# Patient Record
Sex: MALE | Race: BLACK OR AFRICAN AMERICAN | NOT HISPANIC OR LATINO | Employment: FULL TIME | URBAN - METROPOLITAN AREA
[De-identification: names, ages, dates, MRNs, and addresses within clinical notes are randomized per-mention and may not be internally consistent; named-entity substitution may affect disease eponyms.]

---

## 2020-03-11 ENCOUNTER — HOSPITAL ENCOUNTER (EMERGENCY)
Facility: OTHER | Age: 40
Discharge: HOME | End: 2020-03-11
Attending: EMERGENCY MEDICINE

## 2020-03-11 VITALS
RESPIRATION RATE: 18 BRPM | HEIGHT: 67 IN | TEMPERATURE: 98 F | HEART RATE: 68 BPM | SYSTOLIC BLOOD PRESSURE: 137 MMHG | WEIGHT: 165 LBS | BODY MASS INDEX: 25.9 KG/M2 | OXYGEN SATURATION: 97 % | DIASTOLIC BLOOD PRESSURE: 97 MMHG

## 2020-03-11 DIAGNOSIS — H10.32 ACUTE CONJUNCTIVITIS OF LEFT EYE, UNSPECIFIED ACUTE CONJUNCTIVITIS TYPE: Primary | ICD-10-CM

## 2020-03-11 PROCEDURE — 25000003 PHARM REV CODE 250: Performed by: PHYSICIAN ASSISTANT

## 2020-03-11 PROCEDURE — 99283 EMERGENCY DEPT VISIT LOW MDM: CPT

## 2020-03-11 RX ORDER — POLYMYXIN B SULFATE AND TRIMETHOPRIM 1; 10000 MG/ML; [USP'U]/ML
1 SOLUTION OPHTHALMIC EVERY 6 HOURS
Qty: 1 BOTTLE | Refills: 1 | Status: SHIPPED | OUTPATIENT
Start: 2020-03-11

## 2020-03-11 RX ORDER — TETRACAINE HYDROCHLORIDE 5 MG/ML
2 SOLUTION OPHTHALMIC
Status: COMPLETED | OUTPATIENT
Start: 2020-03-11 | End: 2020-03-11

## 2020-03-11 RX ADMIN — TETRACAINE HYDROCHLORIDE 2 DROP: 5 SOLUTION OPHTHALMIC at 02:03

## 2020-03-11 RX ADMIN — FLUORESCEIN SODIUM 1 EACH: 1 STRIP OPHTHALMIC at 02:03

## 2020-03-11 NOTE — ED PROVIDER NOTES
"Encounter Date: 3/11/2020    SCRIBE #1 NOTE: I, Norma Giordanoaver, am scribing for, and in the presence of, Dr. Lei .       History     Chief Complaint   Patient presents with    Eye Pain     +Pt reporting L eye pain and redness with white drainage x2 days. Reports blurred vision to L eye. Denies trauma or injury. Eye movements symmetrical.      Time seen by provider: 2:55 PM    This is a 39 y.o. male who presents with complaint of left eye redness that began two days ago. Patient reports onset of left eye redness, itchiness, mild pain, and clear discharge two mornings ago. He reports "vision fogginess" while driving earlier that has since resolved. He reports using OTC eye drops with no relief. He denies any trauma or injury to the eye. He denies wearing glasses or contacts. He denies cough, rhinorrhea, sore throat, fever, chills, chest pain, or SOB. He denies any medical history. He reports tobacco use; 0.5 ppd. He reports occasional alcohol use. He denies drug use. He denies previous surgeries.    The history is provided by the patient.     Review of patient's allergies indicates:  No Known Allergies  No past medical history on file.  No past surgical history on file.  No family history on file.  Social History     Tobacco Use    Smoking status: Not on file   Substance Use Topics    Alcohol use: Not on file    Drug use: Not on file     Review of Systems   Constitutional: Negative for chills and fever.   HENT: Negative for congestion and sore throat.    Eyes: Positive for pain, discharge, redness, itching and visual disturbance (resolved).   Respiratory: Negative for cough and shortness of breath.    Cardiovascular: Negative for chest pain.   Gastrointestinal: Negative for abdominal pain, diarrhea, nausea and vomiting.   Genitourinary: Negative for dysuria.   Musculoskeletal: Negative for back pain.   Skin: Negative for rash.   Neurological: Negative for dizziness, syncope, weakness, light-headedness, " numbness and headaches.   Hematological: Does not bruise/bleed easily.       Physical Exam     Initial Vitals [03/11/20 1233]   BP Pulse Resp Temp SpO2   129/89 73 18 98.3 °F (36.8 °C) 97 %      MAP       --         Physical Exam    Nursing note and vitals reviewed.  Constitutional: He appears well-developed and well-nourished. No distress.   HENT:   Head: Normocephalic and atraumatic.   Mouth/Throat: Oropharynx is clear and moist.   Eyes: EOM are normal. Pupils are equal, round, and reactive to light. Left eye exhibits chemosis and discharge (clear). Left conjunctiva is injected.   Slit lamp exam:       The left eye shows no fluorescein uptake.   With fluorescein staining there is no uptake with Wood's lamp.   Neck: Normal range of motion.   Cardiovascular: Normal rate, regular rhythm and normal heart sounds. Exam reveals no gallop and no friction rub.    No murmur heard.  Pulmonary/Chest: Breath sounds normal. No respiratory distress. He has no wheezes. He has no rhonchi. He has no rales.   Abdominal: Soft. Bowel sounds are normal. There is no tenderness.   Musculoskeletal: Normal range of motion. He exhibits no tenderness.   Neurological: He is alert and oriented to person, place, and time. He has normal strength. No cranial nerve deficit or sensory deficit. GCS score is 15. GCS eye subscore is 4. GCS verbal subscore is 5. GCS motor subscore is 6.   Skin: Skin is warm and dry.   Psychiatric: He has a normal mood and affect.         ED Course   Procedures  Labs Reviewed - No data to display       Imaging Results    None          Medical Decision Making:   ED Management:  Emergent evaluation a 39-year-old male who presents with complaint of eye pain and redness x2 days, no trauma.  Vital signs are benign, afebrile.  Exam is consistent with conjunctivitis, no corneal abrasion noted.  Visual acuity is benign.  He was prescribed antibiotic eyedrops despite the fact I suspect viral etiology.  He was encouraged strict  hygiene measures and cool compresses, ibuprofen if needed for pain, and follow up with Ophthalmology if symptoms persist or worsen.  He is also encouraged to return to the ED for new or worsening symptoms.            Scribe Attestation:   Scribe #1: I performed the above scribed service and the documentation accurately describes the services I performed. I attest to the accuracy of the note.    Attending Attestation:           Physician Attestation for Scribe:  Physician Attestation Statement for Scribe #1: I, Dr. Lei, reviewed documentation, as scribed by Norma Medrano in my presence, and it is both accurate and complete.                 ED Course as of Mar 11 1525   Wed Mar 11, 2020   1441 Kamran Chance, 39 y.o.  presented to the ED with c/o eye pain and redness.     Patient seen and medically screened by the Physician Assistant in Triage due to ED crowding.  Appropriate tests and/or medications ordered.  A medical screening exam has been performed.  The care will be assumed by myself or another provider when treatment space becomes available.  I am not assuming care of this patient at this time. 2:41 PM. MML        [MM]      ED Course User Index  [MM] Kassie Myers PA-C                Clinical Impression:     1. Acute conjunctivitis of left eye, unspecified acute conjunctivitis type                ED Disposition Condition    Discharge Stable        ED Prescriptions     Medication Sig Dispense Start Date End Date Auth. Provider    polymyxin B sulf-trimethoprim (POLYTRIM) 10,000 unit- 1 mg/mL Drop Place 1 drop into the left eye every 6 (six) hours. 1 Bottle 3/11/2020  Estela Lei MD        Follow-up Information     Follow up With Specialties Details Why Contact Info Additional Information    [facility] [address] Ophthalmology Schedule an appointment as soon as possible for a visit  As needed if symptoms worsen or fail to improve [address] [facility]  [address]  [phone] Ophthalmology - [facility], 3rd Floor, [address] Please park in [address] and use [place] elevators    [facility] Emergency [facility]  As needed, If symptoms worsen [address]  [facility] [address]  [phone]                                      Estela Lei MD  03/12/20 0668

## 2020-03-11 NOTE — ED TRIAGE NOTES
"Pt reports, "I think I got the pink eye." He denies injury to eye. He reports redness and drainage to left eye, reports being sent home from work until he is cleared to return. Pt is AAO x 3, answers questions appropriately   "

## 2021-01-14 ENCOUNTER — HOSPITAL ENCOUNTER (EMERGENCY)
Facility: OTHER | Age: 41
Discharge: HOME | End: 2021-01-14
Attending: EMERGENCY MEDICINE

## 2021-01-14 VITALS
SYSTOLIC BLOOD PRESSURE: 155 MMHG | HEIGHT: 67 IN | WEIGHT: 165 LBS | TEMPERATURE: 99 F | RESPIRATION RATE: 16 BRPM | HEART RATE: 89 BPM | DIASTOLIC BLOOD PRESSURE: 100 MMHG | BODY MASS INDEX: 25.9 KG/M2 | OXYGEN SATURATION: 99 %

## 2021-01-14 DIAGNOSIS — I10 HTN (HYPERTENSION): ICD-10-CM

## 2021-01-14 DIAGNOSIS — R07.9 CHEST PAIN: ICD-10-CM

## 2021-01-14 DIAGNOSIS — R03.0 ELEVATED BLOOD PRESSURE READING WITHOUT DIAGNOSIS OF HYPERTENSION: Primary | ICD-10-CM

## 2021-01-14 LAB
ALBUMIN SERPL BCP-MCNC: 4.6 G/DL (ref 3.5–5.2)
ALP SERPL-CCNC: 59 U/L (ref 55–135)
ALT SERPL W/O P-5'-P-CCNC: 17 U/L (ref 10–44)
ANION GAP SERPL CALC-SCNC: 13 MMOL/L (ref 8–16)
AST SERPL-CCNC: 29 U/L (ref 10–40)
BASOPHILS # BLD AUTO: 0.06 K/UL (ref 0–0.2)
BASOPHILS NFR BLD: 0.6 % (ref 0–1.9)
BILIRUB SERPL-MCNC: 1.3 MG/DL (ref 0.1–1)
BNP SERPL-MCNC: 15 PG/ML (ref 0–99)
BUN SERPL-MCNC: 7 MG/DL (ref 6–20)
CALCIUM SERPL-MCNC: 9.8 MG/DL (ref 8.7–10.5)
CHLORIDE SERPL-SCNC: 102 MMOL/L (ref 95–110)
CO2 SERPL-SCNC: 23 MMOL/L (ref 23–29)
CREAT SERPL-MCNC: 1.2 MG/DL (ref 0.5–1.4)
DIFFERENTIAL METHOD: ABNORMAL
EOSINOPHIL # BLD AUTO: 0.1 K/UL (ref 0–0.5)
EOSINOPHIL NFR BLD: 0.6 % (ref 0–8)
ERYTHROCYTE [DISTWIDTH] IN BLOOD BY AUTOMATED COUNT: 13.3 % (ref 11.5–14.5)
EST. GFR  (AFRICAN AMERICAN): >60 ML/MIN/1.73 M^2
EST. GFR  (NON AFRICAN AMERICAN): >60 ML/MIN/1.73 M^2
GLUCOSE SERPL-MCNC: 119 MG/DL (ref 70–110)
HCT VFR BLD AUTO: 47.4 % (ref 40–54)
HCV AB SERPL QL IA: NEGATIVE
HGB BLD-MCNC: 15.5 G/DL (ref 14–18)
HIV 1+2 AB+HIV1 P24 AG SERPL QL IA: NEGATIVE
IMM GRANULOCYTES # BLD AUTO: 0.03 K/UL (ref 0–0.04)
IMM GRANULOCYTES NFR BLD AUTO: 0.3 % (ref 0–0.5)
LYMPHOCYTES # BLD AUTO: 2.4 K/UL (ref 1–4.8)
LYMPHOCYTES NFR BLD: 24.9 % (ref 18–48)
MCH RBC QN AUTO: 27.7 PG (ref 27–31)
MCHC RBC AUTO-ENTMCNC: 32.7 G/DL (ref 32–36)
MCV RBC AUTO: 85 FL (ref 82–98)
MONOCYTES # BLD AUTO: 0.7 K/UL (ref 0.3–1)
MONOCYTES NFR BLD: 7.4 % (ref 4–15)
NEUTROPHILS # BLD AUTO: 6.4 K/UL (ref 1.8–7.7)
NEUTROPHILS NFR BLD: 66.2 % (ref 38–73)
NRBC BLD-RTO: 0 /100 WBC
PLATELET # BLD AUTO: 347 K/UL (ref 150–350)
PMV BLD AUTO: 8.7 FL (ref 9.2–12.9)
POTASSIUM SERPL-SCNC: 4.4 MMOL/L (ref 3.5–5.1)
PROT SERPL-MCNC: 8.2 G/DL (ref 6–8.4)
RBC # BLD AUTO: 5.6 M/UL (ref 4.6–6.2)
SODIUM SERPL-SCNC: 138 MMOL/L (ref 136–145)
TROPONIN I SERPL DL<=0.01 NG/ML-MCNC: <0.006 NG/ML (ref 0–0.03)
WBC # BLD AUTO: 9.65 K/UL (ref 3.9–12.7)

## 2021-01-14 PROCEDURE — 99285 EMERGENCY DEPT VISIT HI MDM: CPT | Mod: 25

## 2021-01-14 PROCEDURE — 93010 EKG 12-LEAD: ICD-10-PCS | Mod: ,,, | Performed by: INTERNAL MEDICINE

## 2021-01-14 PROCEDURE — 25000003 PHARM REV CODE 250: Performed by: NURSE PRACTITIONER

## 2021-01-14 PROCEDURE — 86703 HIV-1/HIV-2 1 RESULT ANTBDY: CPT

## 2021-01-14 PROCEDURE — 93005 ELECTROCARDIOGRAM TRACING: CPT

## 2021-01-14 PROCEDURE — 84484 ASSAY OF TROPONIN QUANT: CPT

## 2021-01-14 PROCEDURE — 80053 COMPREHEN METABOLIC PANEL: CPT

## 2021-01-14 PROCEDURE — 85025 COMPLETE CBC W/AUTO DIFF WBC: CPT

## 2021-01-14 PROCEDURE — 86803 HEPATITIS C AB TEST: CPT

## 2021-01-14 PROCEDURE — 83880 ASSAY OF NATRIURETIC PEPTIDE: CPT

## 2021-01-14 PROCEDURE — 93010 ELECTROCARDIOGRAM REPORT: CPT | Mod: ,,, | Performed by: INTERNAL MEDICINE

## 2021-01-14 RX ORDER — AMLODIPINE BESYLATE 10 MG/1
10 TABLET ORAL DAILY
Qty: 30 TABLET | Refills: 0 | Status: SHIPPED | OUTPATIENT
Start: 2021-01-14 | End: 2022-01-14

## 2021-01-14 RX ORDER — AMLODIPINE BESYLATE 5 MG/1
10 TABLET ORAL
Status: COMPLETED | OUTPATIENT
Start: 2021-01-14 | End: 2021-01-14

## 2021-01-14 RX ADMIN — AMLODIPINE BESYLATE 10 MG: 5 TABLET ORAL at 09:01

## 2023-02-02 ENCOUNTER — HOSPITAL ENCOUNTER (EMERGENCY)
Facility: HOSPITAL | Age: 43
Discharge: HOME | End: 2023-02-03
Attending: EMERGENCY MEDICINE
Payer: MEDICAID

## 2023-02-02 DIAGNOSIS — R09.02 HYPOXIA: ICD-10-CM

## 2023-02-02 DIAGNOSIS — T40.601A OPIATE OVERDOSE, ACCIDENTAL OR UNINTENTIONAL, INITIAL ENCOUNTER: Primary | ICD-10-CM

## 2023-02-02 LAB
GLUCOSE SERPL-MCNC: 88 MG/DL (ref 70–110)
POCT GLUCOSE: 88 MG/DL (ref 70–110)

## 2023-02-02 PROCEDURE — 99283 EMERGENCY DEPT VISIT LOW MDM: CPT | Mod: 25

## 2023-02-02 PROCEDURE — 99284 EMERGENCY DEPT VISIT MOD MDM: CPT | Mod: ,,, | Performed by: EMERGENCY MEDICINE

## 2023-02-02 PROCEDURE — 99284 PR EMERGENCY DEPT VISIT,LEVEL IV: ICD-10-PCS | Mod: ,,, | Performed by: EMERGENCY MEDICINE

## 2023-02-02 NOTE — Clinical Note
"Kamran"Andrey Chance was seen and treated in our emergency department on 2/2/2023.  He may return to work on 02/04/2023.       If you have any questions or concerns, please don't hesitate to call.      Selene Barney MD"

## 2023-02-03 VITALS
TEMPERATURE: 98 F | SYSTOLIC BLOOD PRESSURE: 135 MMHG | OXYGEN SATURATION: 97 % | HEART RATE: 82 BPM | DIASTOLIC BLOOD PRESSURE: 89 MMHG | RESPIRATION RATE: 17 BRPM

## 2023-02-03 RX ORDER — NALOXONE HYDROCHLORIDE 4 MG/.1ML
SPRAY NASAL
Qty: 1 EACH | Refills: 11 | Status: SHIPPED | OUTPATIENT
Start: 2023-02-03

## 2023-02-03 NOTE — ED PROVIDER NOTES
"Chief Complaint   Drug Overdose (Pt arrives via [facility] after drinking 2 pints of fireball and 4 12 oz beers and snorting unspecified amt of heroin. Pt received 1.5 mg of narcan IV. Pt now AAOX4.)      History Of Present Illness   Kamran Chance is a 42 y.o. male with history of hypertension, who presents with EMS after being found unresponsive, was given 1.5 mg of Narcan and had recovery of consciousness.  Per EMS, the patient had drank alcohol today and snorted heroin, but it is not known how much.  The patient himself initially reports that he does not remember what happened earlier today but whenever I ask him about any opiate use he says that he tried it for the 1st time today.  He is not sure how much he took.    History obtained from:  Patient, EMS    Review of patient's allergies indicates:  No Known Allergies    No current facility-administered medications on file prior to encounter.     Current Outpatient Medications on File Prior to Encounter   Medication Sig Dispense Refill    amLODIPine (NORVASC) 10 MG tablet Take 1 tablet (10 mg total) by mouth once daily. 30 tablet 0    polymyxin B sulf-trimethoprim (POLYTRIM) 10,000 unit- 1 mg/mL Drop Place 1 drop into the left eye every 6 (six) hours. 1 Bottle 1       Past History   As per HPI and below:  History reviewed. No pertinent past medical history.  History reviewed. No pertinent surgical history.    Social History     Socioeconomic History    Marital status: Single   Tobacco Use    Smoking status: Every Day     Packs/day: 0.25     Types: Cigarettes    Smokeless tobacco: Never   Substance and Sexual Activity    Alcohol use: Yes     Alcohol/week: 3.0 standard drinks     Types: 3 Shots of liquor per week    Drug use: Yes     Comment: Heroin, states "first time trying it today'.    Sexual activity: Yes     Partners: Female       History reviewed. No pertinent family history.    Physical Exam     Vitals:    02/02/23 2247 02/02/23 2317 02/03/23 0117 02/03/23 0127 "   BP: (!) 147/101 (!) 146/86 (!) 136/91 135/89   BP Location:    Right arm   Patient Position:    Lying   Pulse: 86 80  82   Resp: 15 17  17   Temp:    97.7 °F (36.5 °C)   TempSrc:    Oral   SpO2: 99% 100%  97%     Gen: AxOx4, NAD, appears stated age, well appearing  Eye: EOMI, no scleral icterus, no periorbital edema or ecchymosis  Head: Normocephalic, atraumatic, no lesions, scalp grossly normal  ENT: neck supple, no stridor, no masses, no abnormal phonation or drooling  CVS: warm and well perfused, cap refill <2 seconds, no extremity pallor  PULM: normal work of breathing, no stridor, equal chest rise, no peripheral cyanosis  ABD: scaphoid, nondistended  Ext: no rash, no deformities, moving all joints with normal ROM  Neuro: MAYA, gait intact, face grossly symmetric  Psych: well groomed, mood and affect congruent, makes good eye contact, cooperative      Initial Impression   This is a 42-year-old man who presents after unintentional opiate overdose, revived with Narcan.  He is awake and alert at this time, breathing spontaneously with good effort, and when he was resting when I 1st entered the room his sats were 89-90 but when he wakes up and with conversation his sats normalized.  Will continue to monitor closely.  Plan for fingerstick glucose.  Will observe him for 2 hours to evaluate for any rebound opiate intoxication.    Medications Given   Medications - No data to display    Results and Course     Labs Reviewed   POCT GLUCOSE MONITORING CONTINUOUS   POCT GLUCOSE       Imaging Results              X-Ray Chest AP Portable (Final result)  Result time 02/02/23 23:21:17      Final result by Gideon Champagne MD (02/02/23 23:21:17)                   Impression:      No acute process.      Electronically signed by: Gideon Champagne MD  Date:    02/02/2023  Time:    23:21               Narrative:    EXAMINATION:  XR CHEST AP PORTABLE    CLINICAL HISTORY:  Hypoxemia    TECHNIQUE:  Single frontal view of the chest was  performed.    COMPARISON:  01/14/2021.    FINDINGS:  The trachea is unremarkable.  The cardiomediastinal silhouette is within normal limits.  The hemidiaphragms are unremarkable.  There are no pleural effusions.  There is no evidence of a pneumothorax.  There is no evidence of pneumomediastinum.  No airspace opacity is present.  The osseous structures are unremarkable.                                               MDM   42 y.o. male with opiate intoxication s/p narcan. Was briefly hypoxic requiring 2LNC but was able to wean quickly, I suspect some aspiration. CXR negative for acute process by my independent interpretation. ON reassessment, he's feeling good, no acute complaints. He remains awake and alert and I do not think requires further ED observation. I counseled him on appropriate use of narcan and he was discharged with a rx for this.          Final diagnoses:  [R09.02] Hypoxia  [T40.601A] Opiate overdose, accidental or unintentional, initial encounter (Primary)        ED Disposition Condition    Discharge Stable          ED Prescriptions       Medication Sig Dispense Start Date End Date Auth. Provider    naloxone (NARCAN) 4 mg/actuation Spry 4mg by nasal route as needed for opioid overdose; may repeat every 2-3 minutes in alternating nostrils until medical help arrives. Call 911 1 each 2/3/2023 -- Selene Barney MD          Follow-up Information       Follow up With Specialties Details Why Contact Info    [facility] - Emergency Dept Emergency Medicine  If symptoms worsen [address]  [facility] [id number] [phone]               Selene Barney MD  02/03/23 6989

## 2023-02-03 NOTE — ED TRIAGE NOTES
Pt found unresponsive by EMS. Per EMS pt drank 2 pints of fireball, 4 beers, and snorted unknown amount of heroin. Pt received 1.5mg of narcan and is now AAOx4. Pt O2 sat 97% on room air.